# Patient Record
Sex: MALE | Race: WHITE
[De-identification: names, ages, dates, MRNs, and addresses within clinical notes are randomized per-mention and may not be internally consistent; named-entity substitution may affect disease eponyms.]

---

## 2022-08-19 ENCOUNTER — NURSE TRIAGE (OUTPATIENT)
Dept: OTHER | Facility: CLINIC | Age: 62
End: 2022-08-19

## 2022-08-19 NOTE — TELEPHONE ENCOUNTER
Received call from Susi Knowles at [facility]; Patient with Red Flag Complaint requesting to establish care with [facility]. Limited triage d/t patient not available at time of call. Subjective: Caller wife Feliz Pinzon states \"I am not sure how long it was there, and on his leg there was a tick bite that he didn't know it was there and now it is swollen and red. Tick was removed. \"     Current Symptoms: right leg calf tick bite, redness and swelling quarter size, redness spreads from ankle to knee. Onset: 1 week ago; gradual, worsening    Associated Symptoms: reduced activity    Pain Severity:  UTD /10; aching, tender; constant    Temperature: Denies       What has been tried: unsure    LMP: NA Pregnant: NA    Recommended disposition: See in Office Today. Advised to go to [facility] if unable to get appointment. Care advice provided, patient verbalizes understanding; denies any other questions or concerns; instructed to call back for any new or worsening symptoms. Patient/caller agrees to proceed to nearest [facility]     Attention Provider: Thank you for allowing me to participate in the care of your patient. The patient was connected to triage in response to information provided to the [facility]. Please do not respond through this encounter as the response is not directed to a shared pool.         Reason for Disposition   Red or very tender (to touch) area and started over 24 hours after the bite    Protocols used: Tick Bite-ADULT-[place]